# Patient Record
Sex: FEMALE | Race: OTHER | Employment: STUDENT | URBAN - METROPOLITAN AREA
[De-identification: names, ages, dates, MRNs, and addresses within clinical notes are randomized per-mention and may not be internally consistent; named-entity substitution may affect disease eponyms.]

---
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
## 2025-04-19 NOTE — ED PROVIDER NOTES
Patient Seen in: [facility] Emergency Department    History     Chief Complaint   Patient presents with    Abdomen/Flank Pain       HPI    19-year-old female presents ER with complaint of right lower quadrant abdominal pain which started approximately 6 days ago.  Patient states the pain comes and goes.  Patient also complaining of pressure when she urinates.  Patient does have a past medical history of appendectomy.  Patient denies any fevers or chills.    History reviewed. Past Medical History[1]    History reviewed. Past Surgical History[2]      Medications :  Prescriptions Prior to Admission[3]     Family History[4]    Smoking Status: Social Hx on file[5]    ROS  All pertinent positives for the review of systems are mentioned in the HPI  All other organ systems are reviewed and are negative.    Constitutional and vital signs reviewed.      Social History and Family History elements reviewed from today, pertinent positives to the presenting problem noted.    Physical Exam     ED Triage Vitals [04/19/25 0009]   /86   Pulse 109   Resp 21   Temp 98.9 °F (37.2 °C)   Temp src Temporal   SpO2 99 %   O2 Device None (Room air)       All measures to prevent infection transmission during my interaction with the patient were taken. The patient was already wearing a droplet mask on my arrival to the room. Personal protective equipment including droplet mask, eye protection, and gloves were worn throughout the duration of the exam.  Handwashing was performed prior to and after the exam.  Stethoscope and any equipment used during my examination was cleaned with super sani-cloth germicidal wipes following the exam.     Physical Exam  Vitals and nursing note reviewed.   Constitutional:       Appearance: She is well-developed.   HENT:      Head: Normocephalic and atraumatic.      Right Ear: External ear normal.      Left Ear: External ear normal.      Nose: Nose normal.   Eyes:      Conjunctiva/sclera: Conjunctivae  normal.      Pupils: Pupils are equal, round, and reactive to light.   Cardiovascular:      Rate and Rhythm: Normal rate and regular rhythm.      Heart sounds: Normal heart sounds.   Pulmonary:      Effort: Pulmonary effort is normal.      Breath sounds: Normal breath sounds.   Abdominal:      General: Bowel sounds are normal.      Palpations: Abdomen is soft.      Tenderness: There is abdominal tenderness in the right lower quadrant. There is no guarding or rebound.   Musculoskeletal:         General: Normal range of motion.      Cervical back: Normal range of motion and neck supple.   Skin:     General: Skin is warm and dry.   Neurological:      Mental Status: She is alert and oriented to person, place, and time.      Deep Tendon Reflexes: Reflexes are normal and symmetric.   Psychiatric:         Behavior: Behavior normal.         Thought Content: Thought content normal.         Judgment: Judgment normal.         ED Course        Labs Reviewed   COMP METABOLIC PANEL (14) - Abnormal; Notable for the following components:       Result Value    Albumin 5.1 (*)     A/G Ratio 2.1 (*)     All other components within normal limits   CBC WITH DIFFERENTIAL WITH PLATELET - Abnormal; Notable for the following components:    WBC 11.3 (*)     All other components within normal limits   URINALYSIS WITH CULTURE REFLEX - Abnormal; Notable for the following components:    Spec Gravity >1.030 (*)     Ketones Urine Trace (*)     Blood Urine Trace (*)     Urobilinogen Urine 2 (*)     RBC Urine 3-5 (*)     Squamous Epi. Cells Few (*)     All other components within normal limits   LIPASE - Normal   POCT PREGNANCY URINE - Normal         Imaging Results Available and Reviewed while in ED: CT ABDOMEN PELVIS WITH IV CONTRAST      IMPRESSION:  CT ABDOMEN PELVIS:  -No evidence of acute intra-abdominal or intra-pelvic abnormality.    -Unremarkable liver, gallbladder, stomach, spleen, pancreas, adrenal glands, kidneys and aorta.  -Prior  appendectomy.  -Unremarkable small and large bowel.  -Age-appropriate uterus and ovaries.  -Physiologic pelvic free fluid.  Pneumoperitoneum.  -Normal appearing spine.    ED Medications Administered:   Medications   ketorolac (Toradol) 30 MG/ML injection 30 mg (30 mg Intravenous Given 4/19/25 0028)   iopamidol 76% (ISOVUE-370) injection for power injector (70 mL Intravenous Given 4/19/25 0120)         MDM     Vitals:    04/19/25 0030 04/19/25 0100 04/19/25 0115 04/19/25 0130   BP: 127/70 112/74 114/73 102/63   Pulse: 91 92 103 98   Resp: 16 14 14 16   Temp:       TempSrc:       SpO2: 98% 98% 98% 97%   Weight:       Height:         *I personally reviewed and interpreted all ED vitals.  I also personally reviewed all labs and imaging if ordered    Pulse Ox: 98%, Room air, Normal     Monitor Interpretation:   normal sinus rhythm    Differential Diagnosis/ Diagnostic Considerations: Colitis, UTI, kidney stone,    Medical Record Review: I personally reviewed available prior medical records for any recent pertinent discharge summaries, testing, and procedures and reviewed those reports.    Complicating Factors: The patient already has does not have a problem list on file. to contribute to the complexity of this ED evaluation.    Medical Decision Making  19-year-old female presents ER with complaint of right lower quadrant pain worsening over the past week.  Patient states she been having the pain now for months ever since she had her appendectomy.  Patient states she has history of constipation.  Patient CT abdomen pelvis shows no acute intra-abdominal process.  Patient states she has been taking MiraLAX.  Patient given a prescription for GoLytely as well as Colace instructed follow-up with GI if her symptoms continue.  Patient's boyfriend bedside made aware of discharge planning and disposition.    Problems Addressed:  Abdominal pain, right lower quadrant: acute illness or injury  Constipation, unspecified constipation  type: acute illness or injury    Amount and/or Complexity of Data Reviewed  Independent Historian:      Details: Medical history obtained from family at bedside states he been complaining of pain worsening for the past week but also has a history of constipation  External Data Reviewed: notes.     Details: Patient's previous admission reviewed from 7/19/2024.  Patient with history of appendectomy at that time at [facility].  Labs: ordered. Decision-making details documented in ED Course.  Radiology: ordered and independent interpretation performed. Decision-making details documented in ED Course.     Details: CT abdomen pelvis reviewed by myself shows no bowel obstruction or constipation        Condition upon leaving the department: Stable    Disposition and Plan     Clinical Impression:  1. Abdominal pain, right lower quadrant    2. Constipation, unspecified constipation type        Disposition:  Discharge    Follow-up:  Denys Sandy MD  [address] [address]  [phone]    Schedule an appointment as soon as possible for a visit  If symptoms worsen      Medications Prescribed:  Current Discharge Medication List        START taking these medications    Details   polyethylene glycol, PEG 3350-KCl-NaBcb-NaCl-NaSulf, 236 g Oral Recon Soln Take 1,000 mL by mouth once for 1 dose.  Qty: 1000 mL, Refills: 0      docusate sodium 100 MG Oral Cap Take 1 capsule (100 mg total) by mouth 2 (two) times daily.  Qty: 20 capsule, Refills: 0                    [1]   Past Medical History:   Thyroid disease   [2]   Past Surgical History:  Procedure Laterality Date    Appendectomy     [3] (Not in a hospital admission)   [4] History reviewed. No pertinent family history.  [5]   Social History  Socioeconomic History    Marital status: Single   Tobacco Use    Smoking status: Never    Smokeless tobacco: Never   Substance and Sexual Activity    Alcohol use: Yes    Drug use: Never

## (undated) NOTE — ED AVS SNAPSHOT
Luciano Bourne   MRN: [id number]    Department:  [facility] Emergency Department   Date of Visit:  10/19/2019           Disclosure     Insurance plans vary and the physician(s) referred by the ER may not be covered by your plan.  Please contact y CARE PHYSICIAN AT ONCE OR RETURN IMMEDIATELY TO THE EMERGENCY DEPARTMENT. If you have been prescribed any medication(s), please fill your prescription right away and begin taking the medication(s) as directed.   If you believe that any of the medications

## (undated) NOTE — LETTER
Date & Time: 10/19/2019, 2:12 PM  Patient: Colton Borrero  Encounter Provider(s):    AMANDA Aldridge       To Whom It May Concern:    Colton Borrero was seen and treated in our department on 10/19/2019.  She should not participate in gym/sports unti